# Patient Record
Sex: MALE | Race: WHITE | NOT HISPANIC OR LATINO
[De-identification: names, ages, dates, MRNs, and addresses within clinical notes are randomized per-mention and may not be internally consistent; named-entity substitution may affect disease eponyms.]

---

## 2022-10-21 PROBLEM — Z00.00 ENCOUNTER FOR PREVENTIVE HEALTH EXAMINATION: Status: ACTIVE | Noted: 2022-10-21

## 2022-10-24 ENCOUNTER — NON-APPOINTMENT (OUTPATIENT)
Age: 57
End: 2022-10-24

## 2022-10-24 DIAGNOSIS — I48.91 UNSPECIFIED ATRIAL FIBRILLATION: ICD-10-CM

## 2022-11-17 ENCOUNTER — APPOINTMENT (OUTPATIENT)
Dept: ENDOCRINOLOGY | Facility: CLINIC | Age: 57
End: 2022-11-17

## 2022-11-17 VITALS
BODY MASS INDEX: 33.59 KG/M2 | OXYGEN SATURATION: 97 % | HEART RATE: 69 BPM | HEIGHT: 72 IN | SYSTOLIC BLOOD PRESSURE: 124 MMHG | DIASTOLIC BLOOD PRESSURE: 80 MMHG | TEMPERATURE: 97.7 F | WEIGHT: 248 LBS

## 2022-11-17 PROCEDURE — 99214 OFFICE O/P EST MOD 30 MIN: CPT

## 2022-11-17 NOTE — HISTORY OF PRESENT ILLNESS
[FreeTextEntry1] : pt is coming in to follow up on hypothyroidism. \par This is a pleasant 57-year-old white male who has a past medical history of hypothyroidism, status post radioactive iodine treatment in August 2012 for hyperthyroidism.  Patient also has a history of intermittent palpitations atrial fibrillation for which he has been followed by his cardiologist.  Patient currently is on levothyroxine 137 mcg tablets daily from Monday to Saturday and 2 tablets on Sundays.  He denies any significant symptoms except for a intermittent fast heartbeat.  Physically he is quite active but he is has gained some weight over the summertime.  He denies any visual changes headache nausea vomiting abdominal pain fever or leg cramps patient is compliant with his medication

## 2022-11-17 NOTE — REVIEW OF SYSTEMS
[Fast Heart Rate] : fast heart rate [FreeTextEntry5] : Intermittent fast heart rate especially when dehydrated and after using alcohol

## 2022-11-17 NOTE — ASSESSMENT
[FreeTextEntry1] : This is a young white gentleman who has a past medical history of hyperthyroidism which was treated with radioactive iodine.  He is now on thyroxine replacement therapy levothyroxine 137 mcg daily for 6 days and 2 tablets on Sundays.  His recent blood work from 11/15/2022 showed a normal TSH of 1.34 and a free T4 of 1.37.  Patient clinically is euthyroid.  He has not had any recent cardiac arrhythmia.  Recommendation\par 1.  Patient will continue on his current dose of levothyroxine.\par 2.  The importance of diet exercise and weight loss was stressed upon the patient.\par 3.  Patient will return to the office in approximately 6 months time with a repeat blood test.  Plan discussed with the patient thank you

## 2022-11-17 NOTE — PHYSICAL EXAM
[Alert] : alert [No Acute Distress] : no acute distress [Well Developed] : well developed [Normal Sclera/Conjunctiva] : normal sclera/conjunctiva [PERRL] : pupils equal, round and reactive to light [Normal Outer Ear/Nose] : the ears and nose were normal in appearance [No Neck Mass] : no neck mass was observed [Thyroid Not Enlarged] : the thyroid was not enlarged [Clear to Auscultation] : lungs were clear to auscultation bilaterally [Normal S1, S2] : normal S1 and S2 [Normal Rate] : heart rate was normal [Regular Rhythm] : with a regular rhythm [Carotids Normal] : carotid pulses were normal with no bruits [No Edema] : no peripheral edema [Not Tender] : non-tender [Not Distended] : not distended [Soft] : abdomen soft [No HSM] : no hepato-splenomegaly [Normal Supraclavicular Nodes] : no supraclavicular lymphadenopathy [Normal Anterior Cervical Nodes] : no anterior cervical lymphadenopathy [No CVA Tenderness] : no ~M costovertebral angle tenderness [No Spinal Tenderness] : no spinal tenderness [No Stigmata of Cushings Syndrome] : no stigmata of Cushings Syndrome [Normal Gait] : normal gait [Normal Strength/Tone] : muscle strength and tone were normal [No Rash] : no rash [No Skin Lesions] : no skin lesions [No Motor Deficits] : the motor exam was normal [Normal Reflexes] : deep tendon reflexes were 2+ and symmetric [No Tremors] : no tremors [Oriented x3] : oriented to person, place, and time [de-identified] : Deferred [FreeTextEntry1] : Deferred [de-identified] : Deferred

## 2023-05-11 ENCOUNTER — APPOINTMENT (OUTPATIENT)
Dept: ENDOCRINOLOGY | Facility: CLINIC | Age: 58
End: 2023-05-11
Payer: COMMERCIAL

## 2023-05-11 VITALS
BODY MASS INDEX: 34.29 KG/M2 | TEMPERATURE: 98.2 F | DIASTOLIC BLOOD PRESSURE: 80 MMHG | SYSTOLIC BLOOD PRESSURE: 130 MMHG | WEIGHT: 253.19 LBS | OXYGEN SATURATION: 99 % | RESPIRATION RATE: 12 BRPM | HEART RATE: 76 BPM | HEIGHT: 72 IN

## 2023-05-11 VITALS
SYSTOLIC BLOOD PRESSURE: 130 MMHG | WEIGHT: 253.19 LBS | BODY MASS INDEX: 34.29 KG/M2 | OXYGEN SATURATION: 99 % | DIASTOLIC BLOOD PRESSURE: 80 MMHG | HEIGHT: 72 IN | HEART RATE: 76 BPM | RESPIRATION RATE: 12 BRPM | TEMPERATURE: 98.2 F

## 2023-05-11 DIAGNOSIS — E03.9 HYPOTHYROIDISM, UNSPECIFIED: ICD-10-CM

## 2023-05-11 DIAGNOSIS — E89.0 POSTPROCEDURAL HYPOTHYROIDISM: ICD-10-CM

## 2023-05-11 PROCEDURE — 99213 OFFICE O/P EST LOW 20 MIN: CPT

## 2023-05-11 RX ORDER — LEVOTHYROXINE SODIUM 0.14 MG/1
137 TABLET ORAL
Qty: 102 | Refills: 4 | Status: ACTIVE | COMMUNITY
Start: 2023-05-11 | End: 1900-01-01

## 2023-05-11 RX ORDER — LEVOTHYROXINE SODIUM 0.14 MG/1
137 TABLET ORAL
Qty: 102 | Refills: 3 | Status: DISCONTINUED | COMMUNITY
End: 2023-05-11

## 2023-05-11 NOTE — PHYSICAL EXAM
[Alert] : alert [Well Nourished] : well nourished [No Acute Distress] : no acute distress [Well Developed] : well developed [Normal Sclera/Conjunctiva] : normal sclera/conjunctiva [EOMI] : extra ocular movement intact [No Proptosis] : no proptosis [Normal Oropharynx] : the oropharynx was normal [Thyroid Not Enlarged] : the thyroid was not enlarged [No Thyroid Nodules] : no palpable thyroid nodules [No Respiratory Distress] : no respiratory distress [No Accessory Muscle Use] : no accessory muscle use [Clear to Auscultation] : lungs were clear to auscultation bilaterally [Normal S1, S2] : normal S1 and S2 [Regular Rhythm] : with a regular rhythm [Normal Rate] : heart rate was normal [No Edema] : no peripheral edema [Pedal Pulses Normal] : the pedal pulses are present [Normal Bowel Sounds] : normal bowel sounds [Not Tender] : non-tender [Not Distended] : not distended [Soft] : abdomen soft [Normal Anterior Cervical Nodes] : no anterior cervical lymphadenopathy [Normal Posterior Cervical Nodes] : no posterior cervical lymphadenopathy [No Spinal Tenderness] : no spinal tenderness [Spine Straight] : spine straight [No Stigmata of Cushings Syndrome] : no stigmata of Cushings Syndrome [Normal Gait] : normal gait [Normal Strength/Tone] : muscle strength and tone were normal [No Rash] : no rash [Acanthosis Nigricans] : no acanthosis nigricans [Normal Reflexes] : deep tendon reflexes were 2+ and symmetric [No Tremors] : no tremors [Oriented x3] : oriented to person, place, and time

## 2023-05-11 NOTE — ASSESSMENT
[FreeTextEntry1] : This is a pleasant male young white male with a past medical history of hypothyroidism secondary to radioactive iodine treatment for Graves' disease and who is currently stable on levothyroxine.  His most recent blood test performed on 5/8/2023 show a TSH level of 1.98 and a free T4 of 1.35.  Patient is well compliant with his medications and takes them daily.  Patient also has a history of hyperlipidemia for which he was taking Crestor but he claims that he ran out of the medication few days ago and he is feeling better with significant improvement in his body aches and joint pains.  Recommendation\par 1.  Patient will continue the current dose of levothyroxine 137 mcg tablets Monday to Saturday and 2 tablets on Sundays.\par 2.  Patient will follow-up with his cardiologist in a few weeks time when he was discussed with him whether to continue to take the Crestor or to be changed to another form of a statin.\par 3.  The importance of diet exercise and weight loss was also discussed with the patient.\par 4.  Patient will return to the office in approximately 6 months time with a repeat blood test.  Plan was discussed in detail with the patient.  Thank you

## 2023-05-11 NOTE — HISTORY OF PRESENT ILLNESS
[FreeTextEntry1] : 58-year-old white male with a past medical history of hypothyroidism secondary to radioactive iodine treatment more than 10 years ago.  Patient since then has been maintained on levothyroxine and he is currently on 137 mcg tablet daily for 6 days and 2 tablets on 1 day of the week.  Patient also has a history of intermittent palpitations and atrial fibrillation for which she is being followed by his cardiologist.  He is currently wearing a Holter monitor.  Patient denies any significant symptoms of palpitations chest pain headache blurry vision nausea vomiting or abdominal pain.  His weight has been stable and is energy level is well-preserved.  His review of systems is basically negative

## 2024-01-10 ENCOUNTER — APPOINTMENT (OUTPATIENT)
Dept: ENDOCRINOLOGY | Facility: CLINIC | Age: 59
End: 2024-01-10
Payer: COMMERCIAL

## 2024-01-10 VITALS
BODY MASS INDEX: 34.13 KG/M2 | SYSTOLIC BLOOD PRESSURE: 122 MMHG | HEIGHT: 72 IN | DIASTOLIC BLOOD PRESSURE: 80 MMHG | RESPIRATION RATE: 14 BRPM | TEMPERATURE: 97.6 F | WEIGHT: 252 LBS | HEART RATE: 90 BPM | OXYGEN SATURATION: 96 %

## 2024-01-10 DIAGNOSIS — R63.5 ABNORMAL WEIGHT GAIN: ICD-10-CM

## 2024-01-10 PROCEDURE — 99213 OFFICE O/P EST LOW 20 MIN: CPT

## 2024-01-10 RX ORDER — LEVOTHYROXINE SODIUM 0.14 MG/1
137 TABLET ORAL
Qty: 102 | Refills: 3 | Status: ACTIVE | COMMUNITY
Start: 2024-01-10 | End: 1900-01-01

## 2024-01-10 RX ORDER — EVOLOCUMAB 140 MG/ML
140 INJECTION, SOLUTION SUBCUTANEOUS
Refills: 0 | Status: ACTIVE | COMMUNITY

## 2024-01-10 NOTE — PHYSICAL EXAM
[Alert] : alert [Well Nourished] : well nourished [Healthy Appearance] : healthy appearance [Well Developed] : well developed [Normal Sclera/Conjunctiva] : normal sclera/conjunctiva [Normal Outer Ear/Nose] : the ears and nose were normal in appearance [Normal TMs] : both tympanic membranes were normal [No Neck Mass] : no neck mass was observed [Thyroid Not Enlarged] : the thyroid was not enlarged [No Respiratory Distress] : no respiratory distress [Normal S1, S2] : normal S1 and S2 [No Murmurs] : no murmurs [Normal Rate] : heart rate was normal [Regular Rhythm] : with a regular rhythm [Carotids Normal] : carotid pulses were normal with no bruits [No Edema] : no peripheral edema [Pedal Pulses Normal] : the pedal pulses are present [Normal Bowel Sounds] : normal bowel sounds [Not Tender] : non-tender [Soft] : abdomen soft [Normal Supraclavicular Nodes] : no supraclavicular lymphadenopathy [Normal Anterior Cervical Nodes] : no anterior cervical lymphadenopathy [No CVA Tenderness] : no ~M costovertebral angle tenderness [No Spinal Tenderness] : no spinal tenderness [No Stigmata of Cushings Syndrome] : no stigmata of Cushings Syndrome [Normal Gait] : normal gait [No Joint Swelling] : no joint swelling seen [No Rash] : no rash [No Skin Lesions] : no skin lesions [Cranial Nerves Intact] : cranial nerves 2-12 were intact [No Motor Deficits] : the motor exam was normal [Normal Reflexes] : deep tendon reflexes were 2+ and symmetric [No Tremors] : no tremors [Oriented x3] : oriented to person, place, and time [Normal Insight/Judgement] : insight and judgment were intact [de-identified] : Deferred [de-identified] : Occasional heat intolerance

## 2024-01-10 NOTE — ASSESSMENT
[FreeTextEntry1] : Patient white male who has a past medical history of hypothyroidism secondary to radioactive iodine treatment and who is currently taking levothyroxine.  137 mcg tablet daily from Monday to Saturday and 2 tablets on Sunday.  He recently had a blood work done in November 2023 which showed that the TSH is within range at 0.710 and the free T4 is normal 1.60 patient is also taking the rib.  2 weeks.  Patient claimed that he recently had a repeat blood test and the cholesterol levels had improved significantly.  Recommendation 1.  I have advised the patient to continue with the current levothyroxine dosage 2.  I am referring the patient for blood test to recheck his thyroid profile together with a fasting glucose level. 3.  If there is a within range , the patient will continue with the levothyroxine and follow- up in 6 months time 4.  Importance of diet exercise and maintenance of normal weight was discussed with the patient.

## 2024-01-10 NOTE — HISTORY OF PRESENT ILLNESS
[FreeTextEntry1] : Patient last seen 5/11/2023 most recent labs dated for 11/16/2023. Cardiologist suggest to patient to stop taking cholesterol medication after stopping medication his cholesterol elevated, Cardiologist believes medications for thyroid needs to be adjusted.  58-year-old white male with a past medical history of hypothyroidism secondary to radioactive iodine treatment for overactive thyroid who presents for routine follow-up.  Patient is currently taking levothyroxine 137 mcg tablet daily from Monday to Saturday and 2 tablets on Sundays.  Patient reports that he recently was diagnosed to have elevated cholesterol in spite of the fact that he was on a statin.  However he had developed some side effect with some muscle pain and also weight gain and for this reason the rosuvastatin was discontinued and now he is on Repatha injections patient denies any significant symptoms.  Physically he is very active and his weight has been stable.  He denies any palpitations chest pains shortness of breath.  He is compliant with his medication and takes it on a daily basis.  Patient denies any visual changes

## 2024-10-07 ENCOUNTER — APPOINTMENT (OUTPATIENT)
Dept: ENDOCRINOLOGY | Facility: CLINIC | Age: 59
End: 2024-10-07
Payer: COMMERCIAL

## 2024-10-07 VITALS
WEIGHT: 240 LBS | DIASTOLIC BLOOD PRESSURE: 84 MMHG | HEIGHT: 72 IN | SYSTOLIC BLOOD PRESSURE: 128 MMHG | OXYGEN SATURATION: 96 % | TEMPERATURE: 98 F | HEART RATE: 68 BPM | BODY MASS INDEX: 32.51 KG/M2

## 2024-10-07 DIAGNOSIS — E03.9 HYPOTHYROIDISM, UNSPECIFIED: ICD-10-CM

## 2024-10-07 DIAGNOSIS — E89.0 POSTPROCEDURAL HYPOTHYROIDISM: ICD-10-CM

## 2024-10-07 PROCEDURE — 99213 OFFICE O/P EST LOW 20 MIN: CPT

## 2024-10-07 RX ORDER — CEFDINIR 300 MG/1
300 CAPSULE ORAL
Qty: 20 | Refills: 0 | Status: COMPLETED | COMMUNITY
Start: 2024-07-24

## 2025-06-30 ENCOUNTER — APPOINTMENT (OUTPATIENT)
Dept: ENDOCRINOLOGY | Facility: CLINIC | Age: 60
End: 2025-06-30
Payer: COMMERCIAL

## 2025-06-30 VITALS
SYSTOLIC BLOOD PRESSURE: 124 MMHG | HEIGHT: 72 IN | DIASTOLIC BLOOD PRESSURE: 72 MMHG | WEIGHT: 235 LBS | OXYGEN SATURATION: 97 % | BODY MASS INDEX: 31.83 KG/M2 | HEART RATE: 74 BPM

## 2025-06-30 LAB — HBA1C MFR BLD HPLC: 5.4

## 2025-06-30 PROCEDURE — 99213 OFFICE O/P EST LOW 20 MIN: CPT
